# Patient Record
Sex: FEMALE | Race: OTHER | HISPANIC OR LATINO
[De-identification: names, ages, dates, MRNs, and addresses within clinical notes are randomized per-mention and may not be internally consistent; named-entity substitution may affect disease eponyms.]

---

## 2019-05-10 ENCOUNTER — RESULT REVIEW (OUTPATIENT)
Age: 29
End: 2019-05-10

## 2020-10-27 ENCOUNTER — APPOINTMENT (OUTPATIENT)
Dept: SURGERY | Facility: CLINIC | Age: 30
End: 2020-10-27

## 2020-12-10 ENCOUNTER — APPOINTMENT (OUTPATIENT)
Dept: SURGERY | Facility: CLINIC | Age: 30
End: 2020-12-10
Payer: MEDICAID

## 2020-12-10 VITALS
HEIGHT: 65 IN | BODY MASS INDEX: 21.66 KG/M2 | WEIGHT: 130 LBS | HEART RATE: 74 BPM | DIASTOLIC BLOOD PRESSURE: 74 MMHG | SYSTOLIC BLOOD PRESSURE: 116 MMHG

## 2020-12-10 PROCEDURE — 99204 OFFICE O/P NEW MOD 45 MIN: CPT

## 2020-12-10 PROCEDURE — 99072 ADDL SUPL MATRL&STAF TM PHE: CPT

## 2020-12-15 NOTE — REASON FOR VISIT
[Initial Consultation] : an initial consultation for [Other: _____] : [unfilled] [FreeTextEntry2] : enlarging right thyroid nodule

## 2020-12-15 NOTE — HISTORY OF PRESENT ILLNESS
[de-identified] : Patient referred by Dr. Pate for evaluation of enlarging right thyroid nodule. Patient was noted to have a thyroid nodule several years ago, which has increased in size. Patient reports increased pressure, and occasional shortness of breath. Patient denies dysphagia, change in voice or radiation exposure. Thyroid ultrasound October 2020: Right globe 6.7 x 3.4 x 4.2 CM with nodule measuring 5.2 x 3.4 x 4.6 CM. The left lobe by 0.5 x 1.2 x 2.1 CM, no nodules noted. Biopsy, right nodule, May 2019 benign. Calcium 9.8, TSH 1.7.

## 2020-12-15 NOTE — CONSULT LETTER
[Dear  ___] : Dear  [unfilled], [Consult Letter:] : I had the pleasure of evaluating your patient, [unfilled]. [Please see my note below.] : Please see my note below. [Consult Closing:] : Thank you very much for allowing me to participate in the care of this patient.  If you have any questions, please do not hesitate to contact me. [FreeTextEntry2] : Dr. Travis Pate [FreeTextEntry3] : Sincerely yours,\par \par Krystle Solano MD, FACS\par Assistant Professor of Surgery\par [facility]

## 2020-12-15 NOTE — ASSESSMENT
[FreeTextEntry1] : Patient with enlarging right thyroid nodule. Due to increase in size and symptoms, I've recommended a right thyroid lobectomy with frozen section and total thyroidectomy only if malignancy identified.  I have reviewed the pathophysiology of the disease process, the area anatomy and the rationale for surgery.  I have discussed the risks, benefits and alternative treatments which include but are not limited to bleeding, infection, numbness, hoarseness, hypocalcemia, scarring, and need for reoperation. I have answered the patient's questions to their satisfaction. The patient wishes to proceed with recommended procedure.They will contact my office to schedule surgery.

## 2020-12-15 NOTE — PHYSICAL EXAM
[Normal] : no neck adenopathy [de-identified] : no cervical or supraclavicular adenopathy, trachea deviating to the left, thyroid with right nodule 5 cm  [de-identified] : Skin:  normal appearance.  no rash, nodules, vesicles, or erythema,\par Musculoskeletal:  full range of motion and no deformities appreciated\par Neurological:  grossly intact\par Psychiatric:  oriented to person, place and time with appropriate affect

## 2021-01-14 ENCOUNTER — RESULT REVIEW (OUTPATIENT)
Age: 31
End: 2021-01-14

## 2021-01-28 ENCOUNTER — NON-APPOINTMENT (OUTPATIENT)
Age: 31
End: 2021-01-28

## 2021-02-03 ENCOUNTER — APPOINTMENT (OUTPATIENT)
Dept: SURGERY | Facility: HOSPITAL | Age: 31
End: 2021-02-03

## 2021-04-13 ENCOUNTER — NON-APPOINTMENT (OUTPATIENT)
Age: 31
End: 2021-04-13

## 2021-11-04 ENCOUNTER — APPOINTMENT (OUTPATIENT)
Dept: SURGERY | Facility: CLINIC | Age: 31
End: 2021-11-04
Payer: MEDICAID

## 2021-11-04 PROCEDURE — 99214 OFFICE O/P EST MOD 30 MIN: CPT

## 2021-11-04 NOTE — ASSESSMENT
[FreeTextEntry1] : Patient with enlarging right thyroid nodule. Due to increase in size and symptoms, I've recommended a right thyroid lobectomy.  I have reviewed the pathophysiology of the disease process, the area anatomy and the rationale for surgery.  I have discussed the risks, benefits and alternative treatments which include but are not limited to bleeding, infection, numbness, hoarseness, hypocalcemia, scarring, and need for reoperation. I have answered the patient's questions to their satisfaction. The patient wishes to proceed with recommended procedure.They will contact my office to schedule surgery.

## 2021-11-04 NOTE — HISTORY OF PRESENT ILLNESS
[de-identified] : Patient referred by Dr. Pate for evaluation of enlarging right thyroid nodule. Patient was noted to have a thyroid nodule several years ago, which has increased in size. Patient reports increased pressure, and occasional shortness of breath. Patient denies dysphagia, change in voice or radiation exposure. Thyroid ultrasound October 2020: Right globe 6.7 x 3.4 x 4.2 CM with nodule measuring 5.2 x 3.4 x 4.6 CM. The left lobe by 0.5 x 1.2 x 2.1 CM, no nodules noted. Biopsy, right nodule, May 2019 benign. Calcium 9.8, TSH 1.7. patient 2 months postpartum, with healthy baby boy.  ready to schedule surgery.  reports increase in right nodule.  Patient denies dysphagia, hoarseness, pain or SOB. I have reviewed all old and new data and available images.  \par

## 2021-11-04 NOTE — PHYSICAL EXAM
[de-identified] : no cervical or supraclavicular adenopathy, trachea deviating to the left, thyroid with right nodule 8 cm  [Normal] : no neck adenopathy [de-identified] : Skin:  normal appearance.  no rash, nodules, vesicles, or erythema,\par Musculoskeletal:  full range of motion and no deformities appreciated\par Neurological:  grossly intact\par Psychiatric:  oriented to person, place and time with appropriate affect

## 2021-12-01 ENCOUNTER — OUTPATIENT (OUTPATIENT)
Dept: OUTPATIENT SERVICES | Facility: HOSPITAL | Age: 31
LOS: 1 days | End: 2021-12-01
Payer: MEDICAID

## 2021-12-01 VITALS
WEIGHT: 132.72 LBS | SYSTOLIC BLOOD PRESSURE: 100 MMHG | TEMPERATURE: 99 F | DIASTOLIC BLOOD PRESSURE: 62 MMHG | HEART RATE: 81 BPM | HEIGHT: 64 IN | RESPIRATION RATE: 16 BRPM | OXYGEN SATURATION: 100 %

## 2021-12-01 DIAGNOSIS — E04.2 NONTOXIC MULTINODULAR GOITER: ICD-10-CM

## 2021-12-01 DIAGNOSIS — Z98.82 BREAST IMPLANT STATUS: Chronic | ICD-10-CM

## 2021-12-01 DIAGNOSIS — Z01.818 ENCOUNTER FOR OTHER PREPROCEDURAL EXAMINATION: ICD-10-CM

## 2021-12-01 LAB
HCT VFR BLD CALC: 37 % — SIGNIFICANT CHANGE UP (ref 34.5–45)
HGB BLD-MCNC: 11.9 G/DL — SIGNIFICANT CHANGE UP (ref 11.5–15.5)
MCHC RBC-ENTMCNC: 26.4 PG — LOW (ref 27–34)
MCHC RBC-ENTMCNC: 32.2 GM/DL — SIGNIFICANT CHANGE UP (ref 32–36)
MCV RBC AUTO: 82 FL — SIGNIFICANT CHANGE UP (ref 80–100)
NRBC # BLD: 0 /100 WBCS — SIGNIFICANT CHANGE UP (ref 0–0)
PLATELET # BLD AUTO: 320 K/UL — SIGNIFICANT CHANGE UP (ref 150–400)
RBC # BLD: 4.51 M/UL — SIGNIFICANT CHANGE UP (ref 3.8–5.2)
RBC # FLD: 19.6 % — HIGH (ref 10.3–14.5)
SARS-COV-2 RNA SPEC QL NAA+PROBE: SIGNIFICANT CHANGE UP
WBC # BLD: 6.14 K/UL — SIGNIFICANT CHANGE UP (ref 3.8–10.5)
WBC # FLD AUTO: 6.14 K/UL — SIGNIFICANT CHANGE UP (ref 3.8–10.5)

## 2021-12-01 PROCEDURE — G0463: CPT

## 2021-12-01 PROCEDURE — 87635 SARS-COV-2 COVID-19 AMP PRB: CPT

## 2021-12-01 PROCEDURE — 85027 COMPLETE CBC AUTOMATED: CPT

## 2021-12-01 PROCEDURE — 36415 COLL VENOUS BLD VENIPUNCTURE: CPT

## 2021-12-01 NOTE — H&P PST ADULT - NEGATIVE GENERAL SYMPTOMS
Problem: Falls - Risk of  Goal: *Absence of Falls  Document Eber Fall Risk and appropriate interventions in the flowsheet.    Outcome: Progressing Towards Goal  Fall Risk Interventions:            Medication Interventions: Patient to call before getting OOB, Teach patient to arise slowly    Elimination Interventions: Call light in reach, Patient to call for help with toileting needs no fever/no chills

## 2021-12-01 NOTE — H&P PST ADULT - NSANTHOSAYNRD_GEN_A_CORE
No. TRUPTI screening performed.  STOP BANG Legend: 0-2 = LOW Risk; 3-4 = INTERMEDIATE Risk; 5-8 = HIGH Risk

## 2021-12-01 NOTE — H&P PST ADULT - HISTORY OF PRESENT ILLNESS
31 year old female with  enlarging right thyroid nodule. Patient was noted to have a thyroid nodule several years ago, which has increased in size. Patient reports increased pressure, and occasional shortness of breath. Patient denies dysphagia, change in voice or radiation exposure. Biopsy, right nodule, May 2019 benign. Denies any other medical issues, feels well today.

## 2021-12-01 NOTE — H&P PST ADULT - FALL HARM RISK - UNIVERSAL INTERVENTIONS
Bed in lowest position, wheels locked, appropriate side rails in place/Call bell, personal items and telephone in reach/Instruct patient to call for assistance before getting out of bed or chair/Non-slip footwear when patient is out of bed/[place] to call system/Physically safe environment - no spills, clutter or unnecessary equipment/Purposeful Proactive Rounding/Room/bathroom lighting operational, light cord in reach

## 2021-12-06 ENCOUNTER — OUTPATIENT (OUTPATIENT)
Dept: OUTPATIENT SERVICES | Facility: HOSPITAL | Age: 31
LOS: 1 days | End: 2021-12-06
Payer: MEDICAID

## 2021-12-06 ENCOUNTER — APPOINTMENT (OUTPATIENT)
Dept: SURGERY | Facility: HOSPITAL | Age: 31
End: 2021-12-06

## 2021-12-06 ENCOUNTER — NON-APPOINTMENT (OUTPATIENT)
Age: 31
End: 2021-12-06

## 2021-12-06 DIAGNOSIS — Z98.82 BREAST IMPLANT STATUS: Chronic | ICD-10-CM

## 2021-12-17 DIAGNOSIS — E04.2 NONTOXIC MULTINODULAR GOITER: ICD-10-CM

## 2022-09-29 ENCOUNTER — APPOINTMENT (OUTPATIENT)
Dept: SURGERY | Facility: CLINIC | Age: 32
End: 2022-09-29

## 2022-09-29 PROCEDURE — 99214 OFFICE O/P EST MOD 30 MIN: CPT

## 2022-09-29 NOTE — PHYSICAL EXAM
[de-identified] : no cervical or supraclavicular adenopathy, trachea deviating to the left, thyroid with right nodule 5 cm  [Normal] : no neck adenopathy [de-identified] : Skin:  normal appearance.  no rash, nodules, vesicles, or erythema,\par Musculoskeletal:  full range of motion and no deformities appreciated\par Neurological:  grossly intact\par Psychiatric:  oriented to person, place and time with appropriate affect

## 2022-09-29 NOTE — ASSESSMENT
[FreeTextEntry1] : Patient with enlarging right thyroid nodule. Due to increase in size and symptoms, I've recommended a right thyroid lobectomy .  I have reviewed the pathophysiology of the disease process, the area anatomy and the rationale for surgery.  I have discussed the risks, benefits and alternative treatments which include but are not limited to bleeding, infection, numbness, hoarseness, hypocalcemia, scarring, and need for reoperation. I have answered the patient's questions to their satisfaction. The patient wishes to proceed with recommended procedure.They will contact my office to schedule surgery.

## 2022-09-29 NOTE — HISTORY OF PRESENT ILLNESS
[de-identified] : Patient referred by Dr. Pate for evaluation of enlarging right thyroid nodule. Patient was noted to have a thyroid nodule several years ago, which has increased in size. Patient reports increased pressure, and occasional shortness of breath. Patient denies dysphagia, change in voice or radiation exposure. Thyroid ultrasound October 2020: Right globe 6.7 x 3.4 x 4.2 CM with nodule measuring 5.2 x 3.4 x 4.6 CM. The left lobe by 0.5 x 1.2 x 2.1 CM, no nodules noted. Biopsy, right nodule, May 2019 benign. Calcium 9.8, TSH 1.7.  Surgery which was scheduled December 2021 canceled due to patient being pregnant.  Patient had a healthy baby boy.  Now ready to schedule surgery.  I have reviewed all old and new data and available images.

## 2022-10-07 ENCOUNTER — OUTPATIENT (OUTPATIENT)
Dept: OUTPATIENT SERVICES | Facility: HOSPITAL | Age: 32
LOS: 1 days | End: 2022-10-07

## 2022-10-07 VITALS
SYSTOLIC BLOOD PRESSURE: 102 MMHG | DIASTOLIC BLOOD PRESSURE: 67 MMHG | HEART RATE: 78 BPM | WEIGHT: 138.89 LBS | RESPIRATION RATE: 16 BRPM | OXYGEN SATURATION: 99 % | TEMPERATURE: 97 F | HEIGHT: 63.5 IN

## 2022-10-07 DIAGNOSIS — E04.1 NONTOXIC SINGLE THYROID NODULE: ICD-10-CM

## 2022-10-07 DIAGNOSIS — Z98.82 BREAST IMPLANT STATUS: Chronic | ICD-10-CM

## 2022-10-07 DIAGNOSIS — E04.9 NONTOXIC GOITER, UNSPECIFIED: ICD-10-CM

## 2022-10-07 LAB
ANION GAP SERPL CALC-SCNC: 12 MMOL/L — SIGNIFICANT CHANGE UP (ref 7–14)
BUN SERPL-MCNC: 8 MG/DL — SIGNIFICANT CHANGE UP (ref 7–23)
CALCIUM SERPL-MCNC: 9.2 MG/DL — SIGNIFICANT CHANGE UP (ref 8.4–10.5)
CHLORIDE SERPL-SCNC: 102 MMOL/L — SIGNIFICANT CHANGE UP (ref 98–107)
CO2 SERPL-SCNC: 25 MMOL/L — SIGNIFICANT CHANGE UP (ref 22–31)
CREAT SERPL-MCNC: 0.57 MG/DL — SIGNIFICANT CHANGE UP (ref 0.5–1.3)
EGFR: 125 ML/MIN/1.73M2 — SIGNIFICANT CHANGE UP
GLUCOSE SERPL-MCNC: 74 MG/DL — SIGNIFICANT CHANGE UP (ref 70–99)
HCG SERPL-ACNC: <5 MIU/ML — SIGNIFICANT CHANGE UP
HCT VFR BLD CALC: 39.2 % — SIGNIFICANT CHANGE UP (ref 34.5–45)
HGB BLD-MCNC: 11.7 G/DL — SIGNIFICANT CHANGE UP (ref 11.5–15.5)
MCHC RBC-ENTMCNC: 24.4 PG — LOW (ref 27–34)
MCHC RBC-ENTMCNC: 29.8 GM/DL — LOW (ref 32–36)
MCV RBC AUTO: 81.8 FL — SIGNIFICANT CHANGE UP (ref 80–100)
NRBC # BLD: 0 /100 WBCS — SIGNIFICANT CHANGE UP (ref 0–0)
NRBC # FLD: 0 K/UL — SIGNIFICANT CHANGE UP (ref 0–0)
PLATELET # BLD AUTO: 346 K/UL — SIGNIFICANT CHANGE UP (ref 150–400)
POTASSIUM SERPL-MCNC: 3.8 MMOL/L — SIGNIFICANT CHANGE UP (ref 3.5–5.3)
POTASSIUM SERPL-SCNC: 3.8 MMOL/L — SIGNIFICANT CHANGE UP (ref 3.5–5.3)
RBC # BLD: 4.79 M/UL — SIGNIFICANT CHANGE UP (ref 3.8–5.2)
RBC # FLD: 21.1 % — HIGH (ref 10.3–14.5)
SODIUM SERPL-SCNC: 139 MMOL/L — SIGNIFICANT CHANGE UP (ref 135–145)
WBC # BLD: 3.75 K/UL — LOW (ref 3.8–10.5)
WBC # FLD AUTO: 3.75 K/UL — LOW (ref 3.8–10.5)

## 2022-10-07 RX ORDER — SODIUM CHLORIDE 9 MG/ML
3 INJECTION INTRAMUSCULAR; INTRAVENOUS; SUBCUTANEOUS EVERY 8 HOURS
Refills: 0 | Status: DISCONTINUED | OUTPATIENT
Start: 2022-10-19 | End: 2022-11-02

## 2022-10-07 NOTE — H&P PST ADULT - HISTORY OF PRESENT ILLNESS
31 y/o female presents to PST preop for right thyroid lobectomy. Pt with right thyroid nodule for several years which has increased in size. Pt denies dysphagia but reports intermittent cough and occasional sore throat.  31 y/o female presents to PST preop for right thyroid lobectomy. Pt with right thyroid nodule for several years which is increasing in size. She denies dysphagia but reports intermittent cough and occasional sore throat.

## 2022-10-07 NOTE — H&P PST ADULT - NEGATIVE ENMT SYMPTOMS
no hearing difficulty/no ear pain/no tinnitus/no vertigo/no sinus symptoms/no nose bleeds/no gum bleeding/no dry mouth/no dysphagia

## 2022-10-07 NOTE — H&P PST ADULT - PROBLEM SELECTOR PLAN 1
preop for right thyroid lobectomy on 10/19/22  preop instructions given, pt verbalized understanding  GI prophylaxis and chlorhexidine wash provided  pt is scheduled for COVID testing preop

## 2022-10-07 NOTE — H&P PST ADULT - 
ADDITIONAL INFORMATION
Pt does not have COVID card present at PST visit Pt does not have COVID vaccine card present at PST visit

## 2022-10-07 NOTE — H&P PST ADULT - GENITOURINARY COMMENTS
pt is 2 months postpartum- menses has not returned pt is 2 months postpartum- menses has not returned yet

## 2022-10-17 LAB — SARS-COV-2 N GENE NPH QL NAA+PROBE: NOT DETECTED

## 2022-10-18 ENCOUNTER — TRANSCRIPTION ENCOUNTER (OUTPATIENT)
Age: 32
End: 2022-10-18

## 2022-10-18 NOTE — ASU PATIENT PROFILE, ADULT - TEACHING/LEARNING FACTORS IMPACT ABILITY TO LEARN
Peripheral Block    Start time: 7/12/2017 9:09 AM  End time: 7/12/2017 9:22 AM  Performed by: Olga Ace  Authorized by: Ogla Ace       Pre-procedure:    Indications: at surgeon's request and post-op pain management    Preanesthetic Checklist: patient identified, risks and benefits discussed, site marked, timeout performed, anesthesia consent given and patient being monitored    Timeout Time: 09:11          Block Type:   Block Type:  Popliteal  Laterality:  Right  Monitoring:  Standard ASA monitoring, responsive to questions and oxygen  Injection Technique:  Single shot  Procedures: ultrasound guided and nerve stimulator    Patient Position: prone  Prep: chlorhexidine    Location:  Mid thigh  Needle Type:  Stimuplex  Needle Gauge:  22 G  Needle Localization:  Ultrasound guidance and nerve stimulator  Motor Response: minimal motor response >0.4 mA    Medication Injected:  0.5%  ropivacaine  Volume (mL):  40    Assessment:  Number of attempts:  1  Injection Assessment:  Incremental injection every 5 mL, no paresthesia, ultrasound image on chart, local visualized surrounding nerve on ultrasound, negative aspiration for blood and no intravascular symptoms  Patient tolerance:  Patient tolerated the procedure well with no immediate complications none

## 2022-10-18 NOTE — ASU PATIENT PROFILE, ADULT - FALL HARM RISK - UNIVERSAL INTERVENTIONS
Bed in lowest position, wheels locked, appropriate side rails in place/Call bell, personal items and telephone in reach/Instruct patient to call for assistance before getting out of bed or chair/Non-slip footwear when patient is out of bed/[place] to call system/Physically safe environment - no spills, clutter or unnecessary equipment/Purposeful Proactive Rounding/Room/bathroom lighting operational, light cord in reach

## 2022-10-19 ENCOUNTER — TRANSCRIPTION ENCOUNTER (OUTPATIENT)
Age: 32
End: 2022-10-19

## 2022-10-19 ENCOUNTER — RESULT REVIEW (OUTPATIENT)
Age: 32
End: 2022-10-19

## 2022-10-19 ENCOUNTER — APPOINTMENT (OUTPATIENT)
Dept: SURGERY | Facility: HOSPITAL | Age: 32
End: 2022-10-19

## 2022-10-19 ENCOUNTER — OUTPATIENT (OUTPATIENT)
Dept: OUTPATIENT SERVICES | Facility: HOSPITAL | Age: 32
LOS: 1 days | Discharge: HOME | End: 2022-10-19

## 2022-10-19 VITALS
HEIGHT: 63.5 IN | OXYGEN SATURATION: 100 % | TEMPERATURE: 98 F | HEART RATE: 76 BPM | RESPIRATION RATE: 16 BRPM | SYSTOLIC BLOOD PRESSURE: 97 MMHG | WEIGHT: 138.89 LBS | DIASTOLIC BLOOD PRESSURE: 65 MMHG

## 2022-10-19 VITALS — HEART RATE: 90 BPM | OXYGEN SATURATION: 100 % | RESPIRATION RATE: 19 BRPM

## 2022-10-19 DIAGNOSIS — Z98.82 BREAST IMPLANT STATUS: Chronic | ICD-10-CM

## 2022-10-19 DIAGNOSIS — E04.1 NONTOXIC SINGLE THYROID NODULE: ICD-10-CM

## 2022-10-19 LAB — HCG UR QL: NEGATIVE — SIGNIFICANT CHANGE UP

## 2022-10-19 PROCEDURE — 13132 CMPLX RPR F/C/C/M/N/AX/G/H/F: CPT | Mod: 59

## 2022-10-19 PROCEDURE — 60271 REMOVAL OF THYROID: CPT

## 2022-10-19 PROCEDURE — 88307 TISSUE EXAM BY PATHOLOGIST: CPT | Mod: 26

## 2022-10-19 RX ORDER — ACETAMINOPHEN 500 MG
2 TABLET ORAL
Qty: 0 | Refills: 0 | DISCHARGE
Start: 2022-10-19

## 2022-10-19 RX ORDER — ACETAMINOPHEN 500 MG
650 TABLET ORAL EVERY 6 HOURS
Refills: 0 | Status: DISCONTINUED | OUTPATIENT
Start: 2022-10-19 | End: 2022-11-02

## 2022-10-19 RX ORDER — BENZOCAINE AND MENTHOL 5; 1 G/100ML; G/100ML
1 LIQUID ORAL
Refills: 0 | Status: DISCONTINUED | OUTPATIENT
Start: 2022-10-19 | End: 2022-11-02

## 2022-10-19 RX ORDER — SODIUM CHLORIDE 9 MG/ML
1000 INJECTION, SOLUTION INTRAVENOUS
Refills: 0 | Status: DISCONTINUED | OUTPATIENT
Start: 2022-10-19 | End: 2022-10-19

## 2022-10-19 RX ORDER — SODIUM CHLORIDE 9 MG/ML
1000 INJECTION, SOLUTION INTRAVENOUS
Refills: 0 | Status: DISCONTINUED | OUTPATIENT
Start: 2022-10-19 | End: 2022-11-02

## 2022-10-19 RX ORDER — BENZOCAINE AND MENTHOL 5; 1 G/100ML; G/100ML
1 LIQUID ORAL
Qty: 0 | Refills: 0 | DISCHARGE
Start: 2022-10-19

## 2022-10-19 RX ORDER — ONDANSETRON 8 MG/1
4 TABLET, FILM COATED ORAL ONCE
Refills: 0 | Status: DISCONTINUED | OUTPATIENT
Start: 2022-10-19 | End: 2022-11-02

## 2022-10-19 RX ORDER — OXYCODONE HYDROCHLORIDE 5 MG/1
5 TABLET ORAL ONCE
Refills: 0 | Status: DISCONTINUED | OUTPATIENT
Start: 2022-10-19 | End: 2022-10-19

## 2022-10-19 RX ORDER — HYDROMORPHONE HYDROCHLORIDE 2 MG/ML
0.5 INJECTION INTRAMUSCULAR; INTRAVENOUS; SUBCUTANEOUS
Refills: 0 | Status: DISCONTINUED | OUTPATIENT
Start: 2022-10-19 | End: 2022-10-19

## 2022-10-19 RX ADMIN — SODIUM CHLORIDE 75 MILLILITER(S): 9 INJECTION, SOLUTION INTRAVENOUS at 15:18

## 2022-10-19 RX ADMIN — SODIUM CHLORIDE 3 MILLILITER(S): 9 INJECTION INTRAMUSCULAR; INTRAVENOUS; SUBCUTANEOUS at 15:06

## 2022-10-19 RX ADMIN — OXYCODONE HYDROCHLORIDE 5 MILLIGRAM(S): 5 TABLET ORAL at 13:00

## 2022-10-19 RX ADMIN — BENZOCAINE AND MENTHOL 1 LOZENGE: 5; 1 LIQUID ORAL at 15:11

## 2022-10-19 RX ADMIN — OXYCODONE HYDROCHLORIDE 5 MILLIGRAM(S): 5 TABLET ORAL at 12:21

## 2022-10-19 NOTE — ASU DISCHARGE PLAN (ADULT/PEDIATRIC) - NS MD DC FALL RISK RISK
For information on Fall & Injury Prevention, visit: [email or web address] OR  [email or web address] OR  https://www.cdc.gov/steadi/patient.html

## 2022-10-19 NOTE — ASU DISCHARGE PLAN (ADULT/PEDIATRIC) - HISTORY OF COVID-19 VACCINATION
Yes Home Suture Removal Text: Patient was provided instructions on removing sutures and will remove their sutures at home.  If they have any questions or difficulties they will call the office.

## 2022-10-19 NOTE — ASU DISCHARGE PLAN (ADULT/PEDIATRIC) - CARE PROVIDER_API CALL
Krystle Solano (MD)  Surgery  [address], [address]  [place] [address]  Phone: [phone]  Fax: [phone]  Scheduled Appointment: 10/27/2022

## 2022-10-19 NOTE — PACU DISCHARGE NOTE - NSCLINEINSERTRD_GEN_ALL_CORE
History of Present Illness


General


Chief Complaint:  Upper Extremity Injury


Source:  Patient





Present Illness


HPI


40-year-old female presents ED for evaluation.  Planing of right shoulder pain 

steroids that her right shoulder pain.  States that about 2 weeks ago she had a 

mechanical fall from wall and landed on her right shoulder.  States she is been 

having persistent pain to that shoulder since.  Did not seek medical attention 

until today.  Pain is dull, 9 out of 10, nonradiating.  Difficult to raise.  

Denies any other injuries.  No other aggravating relieving factors.  Denies any 

other associated symptoms


Allergies:  


Coded Allergies:  


     No Known Allergies (Unverified , 2/18/13)





Patient History


Past Medical History:  asthma


Past Surgical History:  none


Pertinent Family History:  none


Social History:  Reports: alcohol use; Denies: smoking, drug use


Pregnant Now:  No


Immunizations:  UTD


Reviewed Nursing Documentation:  PMH: Agreed; PSxH: Agreed





Nursing Documentation-PMH


Hx Asthma:  Yes





Review of Systems


All Other Systems:  negative except mentioned in HPI





Physical Exam





Vital Signs








  Date Time  Temp Pulse Resp B/P (MAP) Pulse Ox O2 Delivery O2 Flow Rate FiO2


 


8/30/19 06:48 98.1 92 18 143/98 (113) 96 Room Air  








Sp02 EP Interpretation:  reviewed, normal


General Appearance:  no apparent distress, alert, GCS 15, non-toxic


Head:  normocephalic


Eyes:  bilateral eye normal inspection, bilateral eye PERRL


ENT:  normal ENT inspection


Neck:  normal inspection


Respiratory:  normal inspection


Cardiovascular #1:  normal inspection


Gastrointestinal:  normal inspection


Rectal:  deferred


Genitourinary:  no CVA tenderness


Musculoskeletal:  decreased range of motion, tender - R shoulder


Neurologic:  alert, oriented x3, responsive, motor strength/tone normal, 

sensory intact, speech normal


Psychiatric:  normal inspection


Skin:  no rash


Lymphatic:  normal inspection





Procedures


Splinting


Splinting :  


   Consent:  Verbal


   Pre-Made Type:  shoulder sling


   Pre-Proc Neuro Vasc Exam:  normal


   Post-Proc Neuro Vasc Exam:  normal


   Patient Tolerated:  Well


   Complications:  None





Medical Decision Making


Diagnostic Impression:  


 Primary Impression:  


 Shoulder injury


 Qualified Codes:  S49.91XA - Unspecified injury of right shoulder and upper arm

, initial encounter


ER Course


Hospital Course 


40-year-old F presents to ED complaining of R shoulder pain s/p fall x 2 weeks 





Differential diagnoses include: Fracture, dislocation, sprain, contusion





Clinical course


Patient placed on stretcher.  After initial history and physical, I ordered 

pain medications and Xrays of R shoulder





Xrays prelim read shows no acute fracture/dislocation.  





I discussed findings with patient.  Consideration for ligamentous versus other 

cuff injury.  Placed in shoulder sling.  





Safe for discharge for close outpatient follow-up.  I will provide Ortho 

referrals





Diagnosis - shoulder injury





Stable and discharged to home with prescription for Motrin, benedryl, 

albuterol.  apply ice, keep elevated.  weight bear as tolerated.  Followup with 

PMD/ortho.  Return to ED if symptoms recur or worsen


Other X-Ray Diagnostic Results


Other X-Ray Diagnostic Results :  


   X-Ray ordered:  R shoulder


   # of Views/Limited Vs Complete:  3 View


   Indication:  Pain


   EP Interpretation:  Yes


   Interpretation:  no dislocation, no soft tissue swelling, no fractures


   Impression:  No acute disease


   Electronically Signed by:  Electronically signed by Jonathan Adames MD





Last Vital Signs








  Date Time  Temp Pulse Resp B/P (MAP) Pulse Ox O2 Delivery O2 Flow Rate FiO2


 


8/30/19 06:58 98.1 92 18 143/98 96 Room Air  








Status:  improved


Disposition:  HOME, SELF-CARE


Condition:  Stable


Scripts


Ibuprofen* (MOTRIN*) 600 Mg Tablet


600 MG ORAL Q8H PRN for For Pain, #30 TAB 0 Refills


   Prov: Jonathan Adames MD         8/30/19 


Diphenhydramine Hcl (BENADRYL ALLERGY) 25 Mg Tablet


25 MG PO Q6HR, #30 TAB


   Prov: Jonathan Adames MD         8/30/19 


Albuterol Sulfate* (ALBUTEROL SULFATE MDI*) 8.5 Gm Hfa.aer.ad


2 PUFF INH Q6H, #1 EA


   Prov: Jonathan Adames MD         8/30/19


Referrals:  


NOT CHOSEN IPA/MD,REFERRING (PCP)











[facility]











Orthopedic Urgent [facility]





[facility]th[facility]  **Open 24 hour /7 days a week by Appointment Only**





[address]


[facility] [address]


Patient Instructions:  Shoulder Sprain











Jonathan Adames MD Aug 30, 2019 08:16 No

## 2022-10-24 LAB — SURGICAL PATHOLOGY STUDY: SIGNIFICANT CHANGE UP

## 2022-10-25 PROBLEM — E04.1 NONTOXIC SINGLE THYROID NODULE: Chronic | Status: ACTIVE | Noted: 2022-10-07

## 2022-10-27 ENCOUNTER — APPOINTMENT (OUTPATIENT)
Dept: SURGERY | Facility: CLINIC | Age: 32
End: 2022-10-27

## 2022-10-27 PROCEDURE — 99024 POSTOP FOLLOW-UP VISIT: CPT

## 2022-10-27 NOTE — ASSESSMENT
[FreeTextEntry1] : Patient with enlarging right thyroid nodule.  Doing well postop.  Pathology benign.  RTO 6 weeks for blood work and wound check.  I have answered her questions to the best my ability.

## 2022-10-27 NOTE — PHYSICAL EXAM
[de-identified] : Incision healing with min swelling, scar min discussed. no cervical or supraclavicular adenopathy, trachea midline, no palpable masses. [Normal] : no neck adenopathy [de-identified] : Skin:  normal appearance.  no rash, nodules, vesicles, or erythema,\par Musculoskeletal:  full range of motion and no deformities appreciated\par Neurological:  grossly intact\par Psychiatric:  oriented to person, place and time with appropriate affect

## 2022-10-27 NOTE — HISTORY OF PRESENT ILLNESS
[de-identified] : Patient referred by Dr. Pate for evaluation of enlarging right thyroid nodule. Patient was noted to have a thyroid nodule several years ago, which has increased in size. Patient reports increased pressure, and occasional shortness of breath. Patient denies dysphagia, change in voice or radiation exposure. Thyroid ultrasound October 2020: Right globe 6.7 x 3.4 x 4.2 CM with nodule measuring 5.2 x 3.4 x 4.6 CM. The left lobe by 0.5 x 1.2 x 2.1 CM, no nodules noted. Biopsy, right nodule, May 2019 benign. Calcium 9.8, TSH 1.7.  Surgery which was scheduled December 2021 canceled due to patient being pregnant.  Patient had a healthy baby boy. 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  Pathology is benign.   Patient denies dysphagia, hoarseness, pain or parathesias.   I have reviewed all old and new data and available images.

## 2022-12-08 ENCOUNTER — APPOINTMENT (OUTPATIENT)
Dept: SURGERY | Facility: CLINIC | Age: 32
End: 2022-12-08

## 2022-12-08 PROCEDURE — 99213 OFFICE O/P EST LOW 20 MIN: CPT | Mod: 24

## 2022-12-08 NOTE — ASSESSMENT
[FreeTextEntry1] : Patient with enlarging right thyroid nodule.  Doing well postop.  Pathology benign.  Blood work sent, RTO 4 months.  Scar minimizing discussed.   I have answered her questions to the best my ability.

## 2022-12-08 NOTE — PHYSICAL EXAM
[de-identified] : Incision healing well, scar min discussed. no cervical or supraclavicular adenopathy, trachea midline, no palpable masses. [Normal] : no neck adenopathy [de-identified] : Skin:  normal appearance.  no rash, nodules, vesicles, or erythema,\par Musculoskeletal:  full range of motion and no deformities appreciated\par Neurological:  grossly intact\par Psychiatric:  oriented to person, place and time with appropriate affect

## 2022-12-08 NOTE — HISTORY OF PRESENT ILLNESS
[de-identified] : Patient referred by Dr. Pate for evaluation of enlarging right thyroid nodule. Patient was noted to have a thyroid nodule several years ago, which has increased in size. Patient reports increased pressure, and occasional shortness of breath. Patient denies dysphagia, change in voice or radiation exposure. Thyroid ultrasound October 2020: Right globe 6.7 x 3.4 x 4.2 CM with nodule measuring 5.2 x 3.4 x 4.6 CM. The left lobe by 0.5 x 1.2 x 2.1 CM, no nodules noted. Biopsy, right nodule, May 2019 benign. Calcium 9.8, TSH 1.7.  Surgery which was scheduled December 2021 canceled due to patient being pregnant.  Patient had a healthy baby boy. 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  Pathology is benign.   Patient denies dysphagia, hoarseness, pain or parathesias.  Patient denies fatigue or change in weight.  I have reviewed all old and new data and available images.

## 2022-12-15 ENCOUNTER — NON-APPOINTMENT (OUTPATIENT)
Age: 32
End: 2022-12-15

## 2022-12-15 LAB
T3 SERPL-MCNC: 98 NG/DL
T4 FREE SERPL-MCNC: 1.1 NG/DL
TSH SERPL-ACNC: 1.39 UIU/ML

## 2023-04-18 ENCOUNTER — APPOINTMENT (OUTPATIENT)
Dept: SURGERY | Facility: CLINIC | Age: 33
End: 2023-04-18
Payer: MEDICAID

## 2023-04-18 DIAGNOSIS — E04.1 NONTOXIC SINGLE THYROID NODULE: ICD-10-CM

## 2023-04-18 DIAGNOSIS — E04.9 NONTOXIC GOITER, UNSPECIFIED: ICD-10-CM

## 2023-04-18 PROCEDURE — 99213 OFFICE O/P EST LOW 20 MIN: CPT

## 2023-04-18 NOTE — ASSESSMENT
On admission dry mucus membranes, clear lungs, does have LE edema, BNP elevated however suspect overall dry since LA improved with IVF.  Continue to hold torsemide   Resuming valsartan   [FreeTextEntry1] : Patient with enlarging right thyroid nodule.  Doing well postop.  Pathology benign.  Incision healing well, scar minimizing discussed.  Patient will continue with yearly follow-up for blood work and physical exam.  I have answered her questions to the best my ability.

## 2023-04-18 NOTE — PHYSICAL EXAM
[de-identified] : Incision healing well, scar min discussed. no cervical or supraclavicular adenopathy, trachea midline, no palpable masses. [Normal] : no neck adenopathy [de-identified] : Skin:  normal appearance.  no rash, nodules, vesicles, or erythema,\par Musculoskeletal:  full range of motion and no deformities appreciated\par Neurological:  grossly intact\par Psychiatric:  oriented to person, place and time with appropriate affect

## 2023-04-18 NOTE — HISTORY OF PRESENT ILLNESS
[de-identified] : Patient referred by Dr. Pate for evaluation of enlarging right thyroid nodule. Patient was noted to have a thyroid nodule several years ago, which has increased in size. Patient reports increased pressure, and occasional shortness of breath. Patient denies dysphagia, change in voice or radiation exposure. Thyroid ultrasound October 2020: Right globe 6.7 x 3.4 x 4.2 CM with nodule measuring 5.2 x 3.4 x 4.6 CM. The left lobe by 0.5 x 1.2 x 2.1 CM, no nodules noted. Biopsy, right nodule, May 2019 benign. Calcium 9.8, TSH 1.7.  Surgery which was scheduled December 2021 canceled due to patient being pregnant.  Patient had a healthy baby boy. 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  Pathology is benign.   Patient denies dysphagia, hoarseness, pain or parathesias.  Patient denies fatigue or change in weight. not on medication.  I have reviewed all old and new data and available images.
